# Patient Record
Sex: MALE | Race: WHITE | NOT HISPANIC OR LATINO | Employment: OTHER | URBAN - METROPOLITAN AREA
[De-identification: names, ages, dates, MRNs, and addresses within clinical notes are randomized per-mention and may not be internally consistent; named-entity substitution may affect disease eponyms.]

---

## 2023-10-12 ENCOUNTER — OFFICE VISIT (OUTPATIENT)
Dept: ORTHOPEDIC SURGERY | Facility: CLINIC | Age: 78
End: 2023-10-12
Payer: MEDICARE

## 2023-10-12 DIAGNOSIS — M54.16 LUMBAR RADICULOPATHY: Primary | ICD-10-CM

## 2023-10-12 PROBLEM — M84.48XA PATHOLOGIC FRACTURE OF VERTEBRAE: Status: ACTIVE | Noted: 2023-10-12

## 2023-10-12 PROBLEM — M50.20 DISPLACEMENT OF CERVICAL INTERVERTEBRAL DISC WITHOUT MYELOPATHY: Status: ACTIVE | Noted: 2023-10-12

## 2023-10-12 PROBLEM — M47.816 LUMBAR SPONDYLOSIS: Status: ACTIVE | Noted: 2023-10-12

## 2023-10-12 PROBLEM — M48.02 CERVICAL STENOSIS OF SPINE: Status: ACTIVE | Noted: 2023-10-12

## 2023-10-12 PROBLEM — M51.36 DISC DEGENERATION, LUMBAR: Status: ACTIVE | Noted: 2023-10-12

## 2023-10-12 PROBLEM — M48.061 SPINAL STENOSIS OF LUMBAR REGION WITHOUT NEUROGENIC CLAUDICATION: Status: ACTIVE | Noted: 2023-10-12

## 2023-10-12 PROBLEM — M54.12 CERVICAL RADICULOPATHY: Status: ACTIVE | Noted: 2023-10-12

## 2023-10-12 PROBLEM — M54.2 NECK PAIN: Status: ACTIVE | Noted: 2023-10-12

## 2023-10-12 PROBLEM — M50.20 HERNIATED DISC, CERVICAL: Status: ACTIVE | Noted: 2023-10-12

## 2023-10-12 PROCEDURE — 99213 OFFICE O/P EST LOW 20 MIN: CPT | Performed by: PHYSICIAN ASSISTANT

## 2023-10-12 PROCEDURE — 1036F TOBACCO NON-USER: CPT | Performed by: PHYSICIAN ASSISTANT

## 2023-10-12 RX ORDER — DEXTROMETHORPHAN HYDROBROMIDE, GUAIFENESIN 5; 100 MG/5ML; MG/5ML
LIQUID ORAL
COMMUNITY

## 2023-10-12 RX ORDER — METFORMIN HYDROCHLORIDE 500 MG/1
TABLET ORAL
COMMUNITY

## 2023-10-12 RX ORDER — NAPROXEN SODIUM 220 MG/1
TABLET ORAL
COMMUNITY

## 2023-10-12 RX ORDER — LISINOPRIL 10 MG/1
TABLET ORAL
COMMUNITY

## 2023-10-12 RX ORDER — VIT C/E/ZN/COPPR/LUTEIN/ZEAXAN 250MG-90MG
CAPSULE ORAL
COMMUNITY

## 2023-10-12 RX ORDER — LORATADINE 10 MG/1
TABLET ORAL
COMMUNITY

## 2023-10-12 RX ORDER — PRAVASTATIN SODIUM 40 MG/1
TABLET ORAL
COMMUNITY

## 2023-10-12 RX ORDER — ASPIRIN 81 MG/1
TABLET ORAL
COMMUNITY

## 2023-10-12 RX ORDER — POLYETHYLENE GLYCOL 3350 17 G/17G
POWDER, FOR SOLUTION ORAL
COMMUNITY

## 2023-10-12 RX ORDER — ALPRAZOLAM 0.25 MG/1
TABLET ORAL
COMMUNITY

## 2023-10-12 RX ORDER — DIPHENOXYLATE HYDROCHLORIDE AND ATROPINE SULFATE 2.5; .025 MG/1; MG/1
TABLET ORAL
COMMUNITY

## 2023-10-12 RX ORDER — OMEPRAZOLE 20 MG/1
CAPSULE, DELAYED RELEASE ORAL
COMMUNITY

## 2023-10-12 RX ORDER — METOPROLOL TARTRATE 25 MG/1
TABLET, FILM COATED ORAL
COMMUNITY

## 2023-10-12 RX ORDER — TIZANIDINE 4 MG/1
4 TABLET ORAL EVERY 6 HOURS PRN
COMMUNITY
Start: 2022-07-14

## 2023-10-13 NOTE — PROGRESS NOTES
Thad returns today for a follow up.    He is a previous pt of Dr Bonilla and has also followed up with Dr Thomas in the past before as well.     Pt returns today for a clinical eval over concern of LROM of cervical spine and not being able to shave his neck. He also reports some weakness in his BL LE and can't stand long.    Other than that, no UE/LE sxs. No weakness in UE. He does mention that he using cane to ambulate but has been doing so for a little while. No falls. No myelopathy sxs. No changes in his bowel or bladder.     Treatment wise, no recent PT, PMR  or medication txt.     I reviewed the complete 30-point review of systems that was documented on the scanned patient intake form.  All other systems are non-contributory except as defined in history of present illness.    Const: Well-appearing, well-nourished [male] in no distress.  Eyes: Normal appearing sclera and conjunctiva, no jaundice, pupils normal in appearance  Neck: No masses or lymphadenopathy appreciated  Resp: breathing comfortably, normal respiratory rate  CV: No upper or lower extremity edema.  Musculoskeletal: [slow gait]. [ [Cervical] ROM [limited due to stiffness ].  Strength exam of upper and lower extremities reveals 5/5 strength in all major muscle groups   [No intrinsic wasting.] [Negative] Spurling sign.  [Negative] straight leg raise [bilaterally].  Neuro: Sensation is intact and equal bilaterally. Deep tendon reflexes are [normal and symmetric].  [No clonus], [negative] Lizama sign, [negative] Lhermitte's sign  Skin: Intact without any lesions, normal turgor  Psych: Alert and oriented x3, normal mood and affect    We reviewed previous images that were done over the last 2 years.    Plan:  -I would like to treat this conservatively. Pt was in agreement. We will start him back on PT for gait training and LE strength and have him follow up in 6-8 weeks.  -If sxs dont improve in that time frame then I will discuss PMR injections or an MRI  L spine for a more indepth workup.    This note was dictated using speech recognition software and was not corrected for spelling or grammatical errors

## 2023-11-01 ENCOUNTER — EVALUATION (OUTPATIENT)
Dept: PHYSICAL THERAPY | Facility: CLINIC | Age: 78
End: 2023-11-01
Payer: MEDICARE

## 2023-11-01 DIAGNOSIS — M48.02 CERVICAL STENOSIS OF SPINE: Primary | ICD-10-CM

## 2023-11-01 DIAGNOSIS — R26.89 POOR BALANCE: ICD-10-CM

## 2023-11-01 DIAGNOSIS — M54.16 LUMBAR RADICULOPATHY: ICD-10-CM

## 2023-11-01 PROCEDURE — 97161 PT EVAL LOW COMPLEX 20 MIN: CPT | Mod: GP | Performed by: PHYSICAL THERAPIST

## 2023-11-01 PROCEDURE — 97535 SELF CARE MNGMENT TRAINING: CPT | Mod: GP | Performed by: PHYSICAL THERAPIST

## 2023-11-01 ASSESSMENT — ENCOUNTER SYMPTOMS
LOSS OF SENSATION IN FEET: 1
OCCASIONAL FEELINGS OF UNSTEADINESS: 1
DEPRESSION: 0

## 2023-11-01 NOTE — PROGRESS NOTES
Physical Therapy Evaluation    Patient Name: Thad Tan  MRN: [id number]  Today's Date: 11/1/2023  Referred by: Larissa Becerra PA-C  Time Calculation  Start Time: 1330  Stop Time: 1415  Time Calculation (min): 45 min  Diagnosis:  1. Cervical stenosis of spine  Follow Up In Physical Therapy      2. Lumbar radiculopathy  Referral to Physical Therapy    Follow Up In Physical Therapy      3. Poor balance  Follow Up In Physical Therapy      PRECAUTIONS:   Moderate fall risk    SUBJECTIVE:  Patient with long history of back and neck pain, diagnosed with stenosis, has had injections in the past without improvement, main c/o today is poor balance with a recent fall, LBP, neck stiffness and inability to extend neck.  Pain:  5/10 neck and low back/legs  Home Living:  Lives alone on 1 floor home  Prior level of function:  Function limited by cervical and lumbar stenosis    OBJECTIVE:  Lumbar AROM: (% movement)   Flexion full   Extension Unable to get neutral                     Cervical AROM: (degrees)   Flexion full   Extension 5   Right Sidebend 10   Left Sidebend 10   Right Rotation 45   Left Rotation 45     Posture:  Significant forward head and rounded shoulders, no lumbar lordosis  Palpation:  Very tight cervical and lumbar musculature  Functional Outcome Measure:  Oswestry 36%    ASSESSMENT:  Patient presents with c/o poor balance with falls and lack of cervical extension, significant postural and ROM deficits noted from stenosis, therapy will start patient with postural exercises, manual therapy and balance training as tolerated.    TREATMENT:  Initial evaluation performed followed by discussion of findings and instruction in HEP.     PATIENT EDUCATION:  Access Code: [email or web address]  URL: [email or web address]  Date: 11/01/2023  Prepared by: Ilan Carter    Exercises  - Seated Lumbar Flexion Stretch  - 1 x daily - 7 x weekly - 1 sets - 3 reps - 30 hold  - Supine Lower Trunk Rotation  - 1 x  daily - 7 x weekly - 3 sets - 10 reps  - Supine Piriformis Stretch with Foot on Ground  - 1 x daily - 7 x weekly - 1 sets - 3 reps - 30 hold  - Supine Bridge  - 1 x daily - 7 x weekly - 2 sets - 10 reps  - Standing Tandem Balance with Counter Support  - 1 x daily - 7 x weekly - 1 sets - 5 reps - 30 hold  - Seated Scapular Retraction  - 1 x daily - 7 x weekly - 2 sets - 10 reps  - Seated Passive Cervical Retraction  - 1 x daily - 7 x weekly - 2 sets - 10 reps - 5 hold    PLAN:   PT 1 time week, HEP daily.    Rehab potential:  Fair    Plan of care agreement  Patient     GOALS:  Active       PT Problem       Improve posture and cervical extension to allow for shaving       Start:  11/01/23    Expected End:  12/31/23            Patient able to walk community distances with use of cane       Start:  11/01/23    Expected End:  12/31/23            Independent with HEP, no falls       Start:  11/01/23    Expected End:  12/31/23

## 2023-11-01 NOTE — Clinical Note
November 1, 2023     Patient: Thad Tan   YOB: 1945   Date of Visit: 11/1/2023       To Whom It May Concern:    It is my medical opinion that Thad Tan {Work release (duty restriction):29879}.    If you have any questions or concerns, please don't hesitate to call.         Sincerely,        Ilan Carter, PT    CC: No Recipients

## 2023-11-01 NOTE — Clinical Note
November 1, 2023     Patient: Thad Tan   YOB: 1945   Date of Visit: 11/1/2023       To Whom it May Concern:    Thad Tan was seen in my clinic on 11/1/2023. He {Return to school/sport:20167}.    If you have any questions or concerns, please don't hesitate to call.         Sincerely,          Ilan Carter, PT        CC: No Recipients

## 2023-11-08 ENCOUNTER — TREATMENT (OUTPATIENT)
Dept: PHYSICAL THERAPY | Facility: CLINIC | Age: 78
End: 2023-11-08
Payer: MEDICARE

## 2023-11-08 DIAGNOSIS — M48.02 CERVICAL STENOSIS OF SPINE: Primary | ICD-10-CM

## 2023-11-08 DIAGNOSIS — R26.89 POOR BALANCE: ICD-10-CM

## 2023-11-08 DIAGNOSIS — M54.16 LUMBAR RADICULOPATHY: ICD-10-CM

## 2023-11-08 PROCEDURE — 97110 THERAPEUTIC EXERCISES: CPT | Mod: GP,CQ | Performed by: PHYSICAL THERAPY ASSISTANT

## 2023-11-08 NOTE — PROGRESS NOTES
"Physical Therapy Treatment    Patient Name: Thad Tan  MRN: [id number]  Today's Date: 11/8/2023  Visit# 2  Diagnosis:   1. Cervical stenosis of spine        2. Lumbar radiculopathy        3. Poor balance             PRECAUTIONS:      SUBJECTIVE:  Pt reports no pain or symptoms entering therapy. He states the last fall that he experienced was approx 5-6 months ago.     OBJECTIVE:  Tandem stance, <5sec    TREATMENT:  - Therex:   Bike 7' L5  DF/PF 3x10  Calf str (fitter first) 30\"x3  Tandem stance 30\"x2ea  Stand hip abd/ext RTB 2x10ea    Seated CS ret 5\"x20  Seated trunk flex 10\"x10  Rows RTB 2x10  HLR 2x10  H/L bridges 5\"2x10  Piriformis str 10\"x10    - Manual Therapy:       - Neuromuscular Re-education:        - Gait Train:       - Modalities:           ASSESSMENT:   General fatigue with exercises. No cervical and lumbar symptoms noted. Balance appears to be the most problematic issue at this time. He can amb without use of cane for shit distances.     PLAN:   Add tandem walks       "

## 2023-11-15 ENCOUNTER — TREATMENT (OUTPATIENT)
Dept: PHYSICAL THERAPY | Facility: CLINIC | Age: 78
End: 2023-11-15
Payer: MEDICARE

## 2023-11-15 DIAGNOSIS — M48.02 CERVICAL STENOSIS OF SPINE: ICD-10-CM

## 2023-11-15 DIAGNOSIS — R26.89 POOR BALANCE: ICD-10-CM

## 2023-11-15 DIAGNOSIS — M54.16 LUMBAR RADICULOPATHY: ICD-10-CM

## 2023-11-15 PROCEDURE — 97110 THERAPEUTIC EXERCISES: CPT | Mod: GP,CQ | Performed by: PHYSICAL THERAPY ASSISTANT

## 2023-11-15 NOTE — PROGRESS NOTES
"Physical Therapy Treatment    Patient Name: Thad Tan  MRN: [id number]  Today's Date: 11/8/2023  Visit# 3/10  11/01/23-12/31/23  Diagnosis:   1. Cervical stenosis of spine        2. Lumbar radiculopathy        3. Poor balance             PRECAUTIONS:      SUBJECTIVE:  Pt enters into therapy reports amod 5-6/10CS pain today. He reports along with cervical pain he can at times experience radiculitis in bilat UE to hands.     OBJECTIVE:  Increased tandem stance time along with increased PRE's. Performed cone drills well w/o compensation but with use of counter for support.     TREATMENT:  - Therex:   Bike 7' L5  DF/PF 3x12  Calf str (fitter first) 30\"x3  Tandem stance 30\"x2ea  Stand hip abd/ext RTB 2x12ea  Stand alt foot taps on steps 8\"x20  Lateral/fwd-retro step overs x15ea    Seated CS ret 5\"x20  Seated trunk flex 10\"x10  Rows RTB 2x15  HLR 10\"x10  H/L bridges 5\"2x12  Piriformis str 10\"x10    - Manual Therapy:       - Neuromuscular Re-education:        - Gait Train:       - Modalities:           ASSESSMENT:   Balance progression improving but still having chronic pain and stiffness in cervical region. No significant pain reported with session. Everything performed well and manageable. Encouraged to cont HEP.     PLAN:   Add tandem walks, lateral steps       "

## 2023-11-22 ENCOUNTER — TREATMENT (OUTPATIENT)
Dept: PHYSICAL THERAPY | Facility: CLINIC | Age: 78
End: 2023-11-22
Payer: MEDICARE

## 2023-11-22 DIAGNOSIS — M48.02 CERVICAL STENOSIS OF SPINE: ICD-10-CM

## 2023-11-22 DIAGNOSIS — R26.89 POOR BALANCE: ICD-10-CM

## 2023-11-22 DIAGNOSIS — M54.16 LUMBAR RADICULOPATHY: ICD-10-CM

## 2023-11-22 PROCEDURE — 97110 THERAPEUTIC EXERCISES: CPT | Mod: GP,CQ | Performed by: PHYSICAL THERAPY ASSISTANT

## 2023-11-22 NOTE — PROGRESS NOTES
"Physical Therapy Treatment    Patient Name: Thad Tan  MRN: [id number]  Today's Date: 11/8/2023  Visit# 4/10  11/01/23-12/31/23  Diagnosis:   1. Cervical stenosis of spine        2. Lumbar radiculopathy        3. Poor balance             PRECAUTIONS:      SUBJECTIVE:  Pt enters into therapy with nothing new to report. He states he feels pretty good.    OBJECTIVE:  Increased PRE's. Dem improved exercise efficiency.    TREATMENT:  - Therex:   Bike 7' L5  DF/PF 3x15  Calf str (fitter first) 30\"x3  Tandem stance 30\"x2ea  Side steps/monster walks, GTB 20\"x2  Stand hip abd/ext GTB 2x15ea  Stand alt foot taps on steps 8\"2x12  Lateral/fwd-retro step overs (cones) x15ea  HLR 10\"x10  H/L bridges 5\"2x12  Piriformis str 10\"x10    NP  Seated CS ret 5\"x20  Seated trunk flex 10\"x10  Rows RTB 2x15    - Manual Therapy:       - Neuromuscular Re-education:        - Gait Train:       - Modalities:           ASSESSMENT:   Responded well to session. No pain reported today. Consistent with exercise dosage. Independent with HEP. Very good effort and compliance.     PLAN:   Cont with load progression as vanessa.  Add standing rows.        "

## 2023-11-28 ENCOUNTER — TREATMENT (OUTPATIENT)
Dept: PHYSICAL THERAPY | Facility: CLINIC | Age: 78
End: 2023-11-28
Payer: MEDICARE

## 2023-11-28 DIAGNOSIS — M48.02 CERVICAL STENOSIS OF SPINE: ICD-10-CM

## 2023-11-28 DIAGNOSIS — R26.89 POOR BALANCE: ICD-10-CM

## 2023-11-28 DIAGNOSIS — M54.16 LUMBAR RADICULOPATHY: ICD-10-CM

## 2023-11-28 PROCEDURE — 97110 THERAPEUTIC EXERCISES: CPT | Mod: GP,CQ | Performed by: PHYSICAL THERAPY ASSISTANT

## 2023-11-28 NOTE — PROGRESS NOTES
"Physical Therapy Treatment    Patient Name: Thad Tan  MRN: [id number]  Today's Date: 11/8/2023  Visit# 5/10  11/01/23-12/31/23  Diagnosis:   1. Cervical stenosis of spine        2. Lumbar radiculopathy        3. Poor balance             PRECAUTIONS:      SUBJECTIVE:  Pt enters into therapy reporting cervical stiffness and a little pain in low back. He does report increased pain in bilat hips and low back from last session.    OBJECTIVE:  Restricted cervical motion persisting. Limited ret, Rot and S/B. L side cervical motion more restricted than the right.     TREATMENT:  - Therex:   Bike 7' L5  DF/PF 3x15 (1/2 Roll)  Calf str (fitter first) 30\"x3  Tandem stance 30\"x2ea  Side steps/monster walks, GTB 20\"x2-np  Stand hip abd/ext GTB 3x12ea  Stand alt foot taps on steps 8\"2x15  Step ups 8\" 2x10  Rows 7.5# 3x10  Lateral/fwd-retro step overs (cones) x15ea-np  HLR 10\"x10  H/L bridges 5\"2x12  Piriformis str 10\"x10  Seated trunk flex 10\"x10  Seated CS ret 5\"x20  Seated CS rot R/L x20  Seated CS S/B str 20\"x3    - Manual Therapy:       - Neuromuscular Re-education:        - Gait Train:       - Modalities:           ASSESSMENT:  Consistent with therapy treatments. Has had 5 sessions so far and has made mod progression in LE endurance, strength and conditioning. CS ROM still limited especially along the left side. I encouraged him to cont HEP and to schedule his last 5 appt along with recheck.   PLAN:   Add TG squats   "

## 2023-11-29 ENCOUNTER — APPOINTMENT (OUTPATIENT)
Dept: PHYSICAL THERAPY | Facility: CLINIC | Age: 78
End: 2023-11-29
Payer: MEDICARE

## 2023-12-06 ENCOUNTER — TREATMENT (OUTPATIENT)
Dept: PHYSICAL THERAPY | Facility: CLINIC | Age: 78
End: 2023-12-06
Payer: MEDICARE

## 2023-12-06 DIAGNOSIS — R26.89 POOR BALANCE: ICD-10-CM

## 2023-12-06 DIAGNOSIS — M48.02 CERVICAL STENOSIS OF SPINE: ICD-10-CM

## 2023-12-06 DIAGNOSIS — M54.16 LUMBAR RADICULOPATHY: ICD-10-CM

## 2023-12-06 PROCEDURE — 97110 THERAPEUTIC EXERCISES: CPT | Mod: GP,CQ | Performed by: PHYSICAL THERAPY ASSISTANT

## 2023-12-06 NOTE — PROGRESS NOTES
"Physical Therapy Treatment    Patient Name: Thad Tan  MRN: [id number]  Today's Date: 12/6/2023  Visit# 6/10  11/01/23-12/31/23  Diagnosis:   1. Cervical stenosis of spine        2. Lumbar radiculopathy        3. Poor balance             PRECAUTIONS:      SUBJECTIVE:  Pt enters into therapy reporting a high grade of low back muscle spasms from previous session.   OBJECTIVE:  Performed exercises well w/o compensation.   Amb into therapy with increased flex, fwd posture d/t spasms.     TREATMENT:  - Therex:   Bike 6' L5  DF/PF 3x15 (1/2 Roll)-np  Calf str (fitter first) 30\"x3-np  Tandem stance 30\"x2ea-np  Side steps/monster walks, GTB 20\"x2-np  Stand hip abd/ext GTB 3x12ea  Stand alt foot taps on steps 8\"2x15  Step ups 8\" 2x10-NP  Rows 7.5# 3x10  Horizonatal rows with band RTB, x15  Lateral/fwd-retro step overs (cones) x15ea-np  HLR 10\"x10-NP  H/L bridges 5\"2x12  Piriformis str 10\"x10  Sktc 20\"x3, R/L  H/L unilat hip isometric 5\"x10  DKTC with SB, x20  H/L hip abd. BL band 3x10  Seated trunk flex 10\"x10  Supine CS chin tucks 5\"x20  Supine CS rot R/L x20  Seated CS S/B str 20\"x3-np  Stand LS ext x20    - Manual Therapy:       - Neuromuscular Re-education:        - Gait Train:       - Modalities:     MHP, LS, 308-318 (x10'), prior to there ex      ASSESSMENT:  Decreased exercise dosage d/t current state of low back.. Vanessa well modification of exercises. Pain stable and manageable with session.     PLAN:   Progress as vanessa. Add back in resisted hip 3 way as vanessa.   "

## 2023-12-07 ENCOUNTER — APPOINTMENT (OUTPATIENT)
Dept: ORTHOPEDIC SURGERY | Facility: CLINIC | Age: 78
End: 2023-12-07
Payer: MEDICARE

## 2023-12-13 ENCOUNTER — TREATMENT (OUTPATIENT)
Dept: PHYSICAL THERAPY | Facility: CLINIC | Age: 78
End: 2023-12-13
Payer: MEDICARE

## 2023-12-13 DIAGNOSIS — M48.02 CERVICAL STENOSIS OF SPINE: ICD-10-CM

## 2023-12-13 DIAGNOSIS — M54.16 LUMBAR RADICULOPATHY: Primary | ICD-10-CM

## 2023-12-13 DIAGNOSIS — R26.89 POOR BALANCE: ICD-10-CM

## 2023-12-13 PROCEDURE — 97110 THERAPEUTIC EXERCISES: CPT | Mod: GP,CQ | Performed by: PHYSICAL THERAPY ASSISTANT

## 2023-12-13 NOTE — PROGRESS NOTES
"Physical Therapy Treatment    Patient Name: Thad Tan  MRN: [id number]  Today's Date: 12/13/2023  Visit# 7/10  11/01/23-12/31/23  Diagnosis:   1. Cervical stenosis of spine        2. Lumbar radiculopathy        3. Poor balance             PRECAUTIONS:      SUBJECTIVE:  Pt enters into therapy reporting mild low back pain. He states that pain has been about the same since starting therapy. He reports feeling ok since last session with moderate increase in low back pain.     OBJECTIVE:  Amb with fwd flexed posture    TREATMENT:  - Therex:   Bike 7' L5  DF/PF 3x15 (1/2 Roll)-np  Calf str (fitter first) 30\"x3-np  Tandem stance 30\"x2ea-np  Side steps/monster walks, GTB 20\"x2-np  Stand hip abd/ext GTB 3x12ea-np  Stand alt foot taps on steps 8\"2x10  Step ups 8\" 2x10-NP  Rows 7.5# 3x12  Shld ext 5# 3x12  Horizonatal rows with band RTB, x20  HS str on step 20\"x3  Hip flexor str on step R/L x15ea  Lateral/fwd-retro step overs (cones) x15ea-np  HLR 10\"x10-NP  H/L bridges 5\"2x10  Piriformis str 10\"x10  Sktc 20\"x3, R/L  H/L unilat hip isometric 5\"x10  DKTC with SB, x20-np  H/L hip abd. BL band 3x10-np  Seated trunk flex 10\"x10  Supine CS chin tucks 5\"x20-np  Supine CS rot R/L x20-np  Seated CS S/B str 20\"x3-np  Stand LS ext x20-np    - Manual Therapy:       - Neuromuscular Re-education:        - Gait Train:       - Modalities:        ASSESSMENT:  Progression has been slow. Pain still remains at mod grade level. Tolerating activities well during Rx but has a tendency to experience increased soreness a few days afterwards.     PLAN:   Progress as vanessa. Add back in resisted hip 3 way as vanessa.   "

## 2023-12-20 ENCOUNTER — TREATMENT (OUTPATIENT)
Dept: PHYSICAL THERAPY | Facility: CLINIC | Age: 78
End: 2023-12-20
Payer: MEDICARE

## 2023-12-20 DIAGNOSIS — R26.89 POOR BALANCE: ICD-10-CM

## 2023-12-20 DIAGNOSIS — M54.16 LUMBAR RADICULOPATHY: Primary | ICD-10-CM

## 2023-12-20 DIAGNOSIS — M48.02 CERVICAL STENOSIS OF SPINE: ICD-10-CM

## 2023-12-20 PROCEDURE — 97110 THERAPEUTIC EXERCISES: CPT | Mod: GP,CQ | Performed by: PHYSICAL THERAPY ASSISTANT

## 2023-12-20 NOTE — PROGRESS NOTES
"Physical Therapy Treatment    Patient Name: Thad Tan  MRN: [id number]  Today's Date: 12/18/2023  Visit# 8/10  11/01/23-12/31/23  Diagnosis:   1. Cervical stenosis of spine        2. Lumbar radiculopathy        3. Poor balance             PRECAUTIONS:      SUBJECTIVE:  Pt enters into therapy reporting a little pain in low back but also states about a little over a week ago he has been experiencing tingling in bilat hands.     OBJECTIVE:  Cont to present pronounced fwd head posture.    TREATMENT:  - Therex:   Bike 7' L5  DF/PF 3x10 (1/2 Roll)  Calf str (fitter first) 30\"x3  Tandem stance 30\"x2ea  Side steps/monster walks, GTB 20\"x2-np  Stand hip abd/ext GTB 3x12ea-np  Stand alt foot taps on steps 8\"2x10-np  Step ups 8\" 2x10-NP  Rows 8.75# 3x10  Shld ext 6.25# 3x10  Supine CS ret 5\"x20  Supine CS rot R/L 5\"2x10 (with overpressure)  Horizonatal rows with band RTB, x20-np  HS str on step 20\"x3-np  Hip flexor str on step R/L x15ea-np  Lateral/fwd-retro step overs (cones) x15ea-np  HLR 10\"x10-NP  H/L bridges 5\"2x10  Piriformis str 10\"x10  Sktc 20\"x3, R/L  H/L unilat hip isometric 5\"x10-np    DKTC with SB, x20-np  H/L hip abd. BL band 3x10-np  Seated trunk flex 10\"x10  Supine CS chin tucks 5\"x20-np  Seated CS S/B str 20\"x3-np  Stand LS ext x20-np    - Manual Therapy:       - Neuromuscular Re-education:        - Gait Train:       - Modalities:        ASSESSMENT:  Has not dem much improvement since onset of therapy. Still is challenged with some pain and needs to cont working on postural correction. I encouraged him to cont working on postural movements for cervical region. PLAN:   Cont focusing on postural education and training.   "

## 2023-12-27 ENCOUNTER — TREATMENT (OUTPATIENT)
Dept: PHYSICAL THERAPY | Facility: CLINIC | Age: 78
End: 2023-12-27
Payer: MEDICARE

## 2023-12-27 DIAGNOSIS — M48.02 CERVICAL STENOSIS OF SPINE: ICD-10-CM

## 2023-12-27 DIAGNOSIS — R26.89 POOR BALANCE: ICD-10-CM

## 2023-12-27 DIAGNOSIS — M54.16 LUMBAR RADICULOPATHY: ICD-10-CM

## 2023-12-27 PROCEDURE — 97110 THERAPEUTIC EXERCISES: CPT | Mod: GP,CQ | Performed by: PHYSICAL THERAPY ASSISTANT

## 2023-12-27 NOTE — PROGRESS NOTES
"Physical Therapy Treatment    Patient Name: Thad Tan  MRN: [id number]  Today's Date: 12/27/2023  Visit# 9/10  11/01/23-12/31/23  Diagnosis:   1. Cervical stenosis of spine        2. Lumbar radiculopathy        3. Poor balance             PRECAUTIONS:      SUBJECTIVE:  Pt cont to report tingling in bilat hands with back pain along the belt line region with no reports of radicular pain in LE's.  OBJECTIVE:  Dem fwd head posture. Cont to perform exercises well.     TREATMENT:  - Therex:   Bike 7' L5  DF/PF 3x10 (1/2 Roll)  Calf str (fitter first) 30\"x3  Tandem stance 30\"x2ea  Side steps/monster walks, GTB 20\"x2-np  Stand hip abd/ext GTB 3x12ea-np  Stand alt foot taps on steps 8\"2x10-np  Step ups 8\" 2x10-NP  Rows 8.75# 3x12  Shld ext 6.25# 3x12  Supine CS ret 5\"x20  Supine CS rot R/L 5\"2x10 (with overpressure)  Horizonatal rows with band GTB, x15  HS str on step 20\"x3-np  Hip flexor str on step R/L x15ea-np  Lateral/fwd-retro step overs (cones) x15ea-np  HLR 10\"x10-NP  H/L bridges 5\"2x10  Piriformis str 10\"x10  Sktc 20\"x3, R/L  H/L unilat hip isometric 5\"x10-np    DKTC with SB, x20-np  H/L hip abd. BL band 3x10-np  Seated trunk flex 10\"x10  Supine CS chin tucks 5\"x20-np  Seated CS S/B str 20\"x3-np  Stand LS ext x20-np    - Manual Therapy:       - Neuromuscular Re-education:        - Gait Train:       - Modalities:        ASSESSMENT:  Adapting well to exercise dosage. I have modified the exercise dosage for the last several weeks and it appears LBP has remained neutralized along the belt line w/o radicular symptoms but symptoms still persisting in bilat hands. He will follow up with AMY Mena on 1/4/24.     PLAN:   Most likely discharge. Maybe try cervical traction for next visit.     Re assessment. Refer back to Rajesh, PT.  "

## 2024-01-04 ENCOUNTER — TREATMENT (OUTPATIENT)
Dept: PHYSICAL THERAPY | Facility: CLINIC | Age: 79
End: 2024-01-04
Payer: MEDICARE

## 2024-01-04 DIAGNOSIS — R26.89 POOR BALANCE: Primary | ICD-10-CM

## 2024-01-04 DIAGNOSIS — M54.16 LUMBAR RADICULOPATHY: ICD-10-CM

## 2024-01-04 DIAGNOSIS — M48.02 CERVICAL STENOSIS OF SPINE: ICD-10-CM

## 2024-01-04 PROCEDURE — 97110 THERAPEUTIC EXERCISES: CPT | Mod: GP | Performed by: PHYSICAL THERAPIST

## 2024-01-04 PROCEDURE — 97012 MECHANICAL TRACTION THERAPY: CPT | Mod: GP | Performed by: PHYSICAL THERAPIST

## 2024-01-04 NOTE — PROGRESS NOTES
Physical Therapy Re-Eval    Patient Name: Thad Tan  MRN: [id number]  Today's Date: 1/4/24  Visit# 10/10  Diagnosis:   1. Cervical stenosis of spine        2. Lumbar radiculopathy        3. Poor balance        PRECAUTIONS:  Low fall precautions    SUBJECTIVE:  Patient reports minimal progress with past 2 months in therapy, main c/o neck stiffness and intermittent hand numbness, back pain there but tolerable.  Pain:  4/10 neck    OBJECTIVE:  Flexed posture with forward head positioning  C/S extension limited to 5 degrees  + tenderness left sided C/S, very tight musculature    TREATMENT:  - Modalities:    MH to C/S x 10 min  Intermittent C/S traction x 10 min, 20/10#, 30/10 sec  -Manual  STM to C/S x 10 min  - Therex:  Chin tucks x 10  C/S rotation x 10 each way  UT stretch x 10 each way    ASSESSMENT:  Patient has seen little progress in therapy with neck ROM, decided to try STM and traction today to see if we could improve symptoms, patient will email me with update on whether traction and STM were beneficial prior to follow up with ortho next week.    GOALS:  Active       PT Problem       Improve posture and cervical extension to allow for shaving (Not Progressing)       Start:  11/01/23    Expected End:  12/31/23            Patient able to walk community distances with use of cane (Progressing)       Start:  11/01/23    Expected End:  12/31/23            Independent with HEP, no falls (Met)       Start:  11/01/23    Expected End:  12/31/23    Resolved:  01/04/24           PLAN:   Patient on hold, will follow up with ortho next week.

## 2024-01-11 ENCOUNTER — OFFICE VISIT (OUTPATIENT)
Dept: ORTHOPEDIC SURGERY | Facility: CLINIC | Age: 79
End: 2024-01-11
Payer: MEDICARE

## 2024-01-11 DIAGNOSIS — R27.0 ATAXIA: ICD-10-CM

## 2024-01-11 DIAGNOSIS — M54.16 LUMBAR RADICULOPATHY: Primary | ICD-10-CM

## 2024-01-11 DIAGNOSIS — M54.12 CERVICAL RADICULITIS: ICD-10-CM

## 2024-01-11 PROCEDURE — 1160F RVW MEDS BY RX/DR IN RCRD: CPT | Performed by: PHYSICIAN ASSISTANT

## 2024-01-11 PROCEDURE — 99213 OFFICE O/P EST LOW 20 MIN: CPT | Performed by: PHYSICIAN ASSISTANT

## 2024-01-11 PROCEDURE — 1159F MED LIST DOCD IN RCRD: CPT | Performed by: PHYSICIAN ASSISTANT

## 2024-01-11 PROCEDURE — 99213 OFFICE O/P EST LOW 20 MIN: CPT | Mod: ZK | Performed by: PHYSICIAN ASSISTANT

## 2024-01-11 PROCEDURE — 1036F TOBACCO NON-USER: CPT | Performed by: PHYSICIAN ASSISTANT

## 2024-01-11 ASSESSMENT — PAIN - FUNCTIONAL ASSESSMENT: PAIN_FUNCTIONAL_ASSESSMENT: NO/DENIES PAIN

## 2024-01-11 NOTE — PROGRESS NOTES
Thad returns today for follow-up.  I have been following him since October where we were working him up for both his cervical and lumbar pain and radiculopathy.  The patient was also getting worked up for weakness and ataxia.    For the last couple months, the patient has attended physical therapy.  He is gone 10 times and this is all documented through his insurance and shows that he has not had much progress.  He states that he has pretty much stayed the same.  He is relying solely on using a cane to ambulate and his symptoms are getting worse.    With him not improving the next thing we will do is order him an MRI of both the cervical and lumbar spine.  We are going to rule out stenosis as well as potential cord compression to see why he is having this much weakness and ataxia along with the radiculopathy.  As soon as I get these results he will follow-up with us.    This note was dictated using speech recognition software and was not corrected for spelling or grammatical errors

## 2024-01-31 ENCOUNTER — HOSPITAL ENCOUNTER (OUTPATIENT)
Dept: RADIOLOGY | Facility: CLINIC | Age: 79
Discharge: HOME | End: 2024-01-31
Payer: MEDICARE

## 2024-01-31 DIAGNOSIS — M54.16 LUMBAR RADICULOPATHY: ICD-10-CM

## 2024-01-31 DIAGNOSIS — M54.12 CERVICAL RADICULITIS: ICD-10-CM

## 2024-01-31 DIAGNOSIS — R27.0 ATAXIA: ICD-10-CM

## 2024-01-31 PROCEDURE — 72141 MRI NECK SPINE W/O DYE: CPT

## 2024-01-31 PROCEDURE — 72148 MRI LUMBAR SPINE W/O DYE: CPT

## 2024-01-31 PROCEDURE — 72141 MRI NECK SPINE W/O DYE: CPT | Performed by: RADIOLOGY

## 2024-01-31 PROCEDURE — 72148 MRI LUMBAR SPINE W/O DYE: CPT | Performed by: RADIOLOGY

## 2024-03-06 ENCOUNTER — OFFICE VISIT (OUTPATIENT)
Dept: ORTHOPEDIC SURGERY | Facility: CLINIC | Age: 79
End: 2024-03-06
Payer: MEDICARE

## 2024-03-06 DIAGNOSIS — M48.061 DEGENERATIVE LUMBAR SPINAL STENOSIS: Primary | ICD-10-CM

## 2024-03-06 PROCEDURE — 1036F TOBACCO NON-USER: CPT | Performed by: ORTHOPAEDIC SURGERY

## 2024-03-06 PROCEDURE — 1160F RVW MEDS BY RX/DR IN RCRD: CPT | Performed by: ORTHOPAEDIC SURGERY

## 2024-03-06 PROCEDURE — 1159F MED LIST DOCD IN RCRD: CPT | Performed by: ORTHOPAEDIC SURGERY

## 2024-03-06 PROCEDURE — 99214 OFFICE O/P EST MOD 30 MIN: CPT | Performed by: ORTHOPAEDIC SURGERY

## 2024-03-06 NOTE — PROGRESS NOTES
Thad is a pleasant 79-year-old man.  I have seen him in the past for evaluation of both the cervical and lumbar spines.    He indicates he has chronic issues related to stiffness of his neck, some mild balance issues, and numbness in both legs.    He indicates that he really does not have much pain.  No axial neck pain or axial low back pain and no severe radicular symptoms.  He does indicate that his balance problems are in part related to diminished sensation in both legs.    His general health has been stable.  He had a recent cardiac evaluation that was unremarkable.  Blood sugar control continues to be an issue for him.  A1c has been above 9.    Family, social, and medical histories are obtained and reviewed.    30-point, patient-recorded Review of Systems is personally obtained and reviewed. Inclusive is no history of weight loss, change in appetite, recent change in activity level, change in bowel or bladder habits, fevers, chills, malaise, or night pain.    On exam very pleasant older man no acute distress.  He does have a moderate cervical kyphosis.  Negative Lhermitte's however.    His gait is slow but stable.  His strength is intact in both upper and lower extremities without pathologic reflexes.    He does have diminished sensation in a stocking glove distribution of both lower extremities.    His cervical MRI shows moderate cervical kyphosis with advanced degenerative disc disease.  He does not have severe canal stenosis nor spinal cord compression.    His lumbar MRI shows moderate degenerative scoliosis with fairly severe stenosis at L2-3 and L3-4.    Impression: He has not experienced significant axial pain or radiculopathy.  His biggest issue is some limited motion of the cervical spine and balance issues.  I think his balance problems are multifactorial related in part to diabetic peripheral neuropathy as well as likely axial back pain and neck pain.  He is not myelopathic.    I would not recommend  elective surgery.  I think he would be better suited with gait training and the use of a cane or a walker.  If his symptoms were to change or worsen in any severity further evaluation would be indicated.    Will plan to see him back as needed.    ** Dictated with voice recognition software and not immediately reviewed for errors in grammar and/or spelling **

## 2024-03-12 ENCOUNTER — APPOINTMENT (OUTPATIENT)
Dept: ORTHOPEDIC SURGERY | Facility: CLINIC | Age: 79
End: 2024-03-12
Payer: MEDICARE

## 2024-09-04 ENCOUNTER — APPOINTMENT (OUTPATIENT)
Dept: ORTHOPEDIC SURGERY | Facility: CLINIC | Age: 79
End: 2024-09-04
Payer: MEDICARE

## 2024-09-04 DIAGNOSIS — M48.061 DEGENERATIVE LUMBAR SPINAL STENOSIS: Primary | ICD-10-CM

## 2024-09-04 PROCEDURE — 99214 OFFICE O/P EST MOD 30 MIN: CPT | Performed by: ORTHOPAEDIC SURGERY

## 2024-09-04 PROCEDURE — 1159F MED LIST DOCD IN RCRD: CPT | Performed by: ORTHOPAEDIC SURGERY

## 2024-09-04 PROCEDURE — 1036F TOBACCO NON-USER: CPT | Performed by: ORTHOPAEDIC SURGERY

## 2024-09-04 ASSESSMENT — PAIN - FUNCTIONAL ASSESSMENT: PAIN_FUNCTIONAL_ASSESSMENT: NO/DENIES PAIN

## 2024-09-04 NOTE — PROGRESS NOTES
Thad is a 79-year-old man who had been [facility].    He has both cervical and lumbar spondylosis.    He has axial neck pain.  He has some mild subjective issues of gait disturbance.  He does not have severe spinal cord compression.  We had suggested a balance training program which he completed and he does feel that it helped.    He is really not experiencing any significant pain or weakness nor any progressive neurologic symptoms.    He does have limited extension of his cervical spine.    On exam his gait is stable.  His strength is intact.    Again we reviewed his imaging.  He has cervical spondylosis with some degree of canal stenosis mostly at C3-4 but no high-grade spinal cord compression.    He has a moderate coronal plane deformity.  Moderate multilevel lumbar stenosis.    He is functioning well at this point.  He is not myelopathic.  We discussed the nature of his issues.  I would encourage an ongoing nonsurgical approach.  We will plan to see him back on an as-needed basis.

## 2025-03-05 ENCOUNTER — APPOINTMENT (OUTPATIENT)
Dept: ORTHOPEDIC SURGERY | Facility: CLINIC | Age: 80
End: 2025-03-05
Payer: MEDICARE